# Patient Record
Sex: FEMALE | Race: WHITE | NOT HISPANIC OR LATINO | Employment: OTHER | URBAN - NONMETROPOLITAN AREA
[De-identification: names, ages, dates, MRNs, and addresses within clinical notes are randomized per-mention and may not be internally consistent; named-entity substitution may affect disease eponyms.]

---

## 2021-11-11 ENCOUNTER — OFFICE VISIT (OUTPATIENT)
Dept: CARDIOLOGY | Facility: CLINIC | Age: 61
End: 2021-11-11

## 2021-11-11 VITALS
BODY MASS INDEX: 43.72 KG/M2 | SYSTOLIC BLOOD PRESSURE: 120 MMHG | HEART RATE: 58 BPM | TEMPERATURE: 97.8 F | DIASTOLIC BLOOD PRESSURE: 72 MMHG | WEIGHT: 237.6 LBS | HEIGHT: 62 IN

## 2021-11-11 DIAGNOSIS — R07.2 PRECORDIAL PAIN: Primary | ICD-10-CM

## 2021-11-11 DIAGNOSIS — E83.42 HYPOMAGNESEMIA: ICD-10-CM

## 2021-11-11 DIAGNOSIS — E89.0 POSTOPERATIVE HYPOTHYROIDISM: ICD-10-CM

## 2021-11-11 DIAGNOSIS — J45.20 MILD INTERMITTENT ASTHMA WITHOUT COMPLICATION: ICD-10-CM

## 2021-11-11 DIAGNOSIS — G47.30 SLEEP APNEA IN ADULT: ICD-10-CM

## 2021-11-11 DIAGNOSIS — R06.02 SHORTNESS OF BREATH: ICD-10-CM

## 2021-11-11 DIAGNOSIS — E55.9 VITAMIN D DEFICIENCY: ICD-10-CM

## 2021-11-11 DIAGNOSIS — R73.9 HYPERGLYCEMIA: ICD-10-CM

## 2021-11-11 DIAGNOSIS — E66.01 MORBID OBESITY WITH BMI OF 40.0-44.9, ADULT (HCC): ICD-10-CM

## 2021-11-11 PROCEDURE — 99204 OFFICE O/P NEW MOD 45 MIN: CPT | Performed by: INTERNAL MEDICINE

## 2021-11-11 PROCEDURE — 93000 ELECTROCARDIOGRAM COMPLETE: CPT | Performed by: INTERNAL MEDICINE

## 2021-11-11 RX ORDER — FUROSEMIDE 20 MG/1
20 TABLET ORAL DAILY
COMMUNITY

## 2021-11-11 RX ORDER — LEVOTHYROXINE SODIUM 0.1 MG/1
100 TABLET ORAL DAILY
COMMUNITY
End: 2022-06-15

## 2021-11-11 RX ORDER — ALPRAZOLAM 0.25 MG/1
0.25 TABLET ORAL DAILY PRN
COMMUNITY
End: 2022-06-15

## 2021-11-11 RX ORDER — ALBUTEROL SULFATE 2.5 MG/.5ML
2.5 SOLUTION RESPIRATORY (INHALATION) 3 TIMES DAILY PRN
COMMUNITY

## 2021-11-11 RX ORDER — ALLOPURINOL 100 MG/1
100 TABLET ORAL DAILY PRN
COMMUNITY
End: 2022-06-15

## 2021-11-11 RX ORDER — CITALOPRAM 40 MG/1
40 TABLET ORAL DAILY
COMMUNITY

## 2021-11-11 RX ORDER — FLUTICASONE PROPIONATE 50 MCG
2 SPRAY, SUSPENSION (ML) NASAL DAILY
COMMUNITY

## 2021-11-11 RX ORDER — BUDESONIDE 1 MG/2ML
1 INHALANT ORAL DAILY PRN
COMMUNITY
End: 2022-06-15

## 2021-11-11 RX ORDER — ALBUTEROL SULFATE 90 UG/1
2 AEROSOL, METERED RESPIRATORY (INHALATION) EVERY 4 HOURS PRN
COMMUNITY

## 2021-11-11 NOTE — PROGRESS NOTES
Chief Complaint   Patient presents with   • Establish Care     Patient wishes to re -establish care, needing surgical clearance. She was last seen here 15 to 20 years ago.    • Cardiac clearance     Needing clearance for left knee replacement next Friday.   • Chest Pain     Having sharp pains in mid chest lasting from just a few minutes to couple hours. Has had few episodes of nausea associated with chest pain    • Shortness of Breath     Has with exertion and at rest, has noticed more since having covid last year. Wears oxygen as needed. PCP is sending her to Pulmonologist.   • Palpitations     Has occasional flutter at night.   • Aspirin     She is currently not taking due to upcoming surgery. She takes aspirin 81mg 3 times weekly.        CARDIAC COMPLAINTS  chest pressure/discomfort, dyspnea, fatigue and palpitations      Subjective   Danay Mcdowell is a 61 y.o. female came in today for her initial cardiac evaluation.  She has history of diabetes, hypothyroidism has not been on any diabetic medication for a long time.  She has been having severe arthritis and now scheduled to have a left knee surgery done next Friday.  She is now referred for pre op evaluation.  She apparently has been having sharp chest pain lasting from few minutes to few hours.  It occurs anytime of the day.  It is sometimes associated with nausea.  She also has been noticing shortness of breath which occurs mostly on exertion and occasionally at rest.  She did have Covid infection last year and since then the symptoms got worse.  She also started wearing oxygen and she is scheduled to see a pulmonologist soon.  She also has palpitation in the form of heart skipping.  It occurs mostly at nighttime.  She had some labs done in September and her blood sugar was 141.  Her renal function was normal.  Her cholesterol was 334 with the LDL of 206 with an HDL of 45 and triglyceride of 400.  Her blood count currently normal A1c was 6.8.  She has no  history of smoking or drinking alcohol.  Her mother had diabetes and thyroid related problems.  Her father had diabetes and heart attack and one of her sister  at age of 43 with an heart attack.    No past surgical history on file.    Current Outpatient Medications   Medication Sig Dispense Refill   • albuterol (PROVENTIL) 2.5 MG/0.5ML nebulizer solution Take 2.5 mg by nebulization 3 (Three) Times a Day As Needed for Wheezing.     • albuterol sulfate  (90 Base) MCG/ACT inhaler Inhale 2 puffs Every 4 (Four) Hours As Needed for Wheezing.     • allopurinol (ZYLOPRIM) 100 MG tablet Take 100 mg by mouth Daily As Needed.     • ALPRAZolam (XANAX) 0.25 MG tablet Take 0.25 mg by mouth Daily As Needed for Anxiety.     • Ascorbic Acid (VITAMIN C PO) Take  by mouth Daily.     • budesonide (PULMICORT) 1 MG/2ML nebulizer solution Take 1 mg by nebulization Daily As Needed.     • citalopram (CeleXA) 40 MG tablet Take 40 mg by mouth Daily.     • Cyanocobalamin (VITAMIN B 12 PO) Take  by mouth Daily.     • fluticasone (FLONASE) 50 MCG/ACT nasal spray 2 sprays into the nostril(s) as directed by provider Daily.     • furosemide (LASIX) 20 MG tablet Take 20 mg by mouth Daily.     • levothyroxine (SYNTHROID, LEVOTHROID) 100 MCG tablet Take 100 mcg by mouth Daily.     • Multiple Vitamins-Minerals (ZINC PO) Take  by mouth Daily.     • traMADol-acetaminophen (ULTRACET) 37.5-325 MG per tablet Take 1 tablet by mouth 4 (Four) Times a Day As Needed for Moderate Pain .     • VITAMIN D PO Take  by mouth Daily.       No current facility-administered medications for this visit.           ALLERGIES:  Cinnamon flavor and Phenergan [promethazine]    Past Medical History:   Diagnosis Date   • Chronic asthmatic bronchitis (HCC)    • Cirrhosis (HCC)    • Depression    • Diabetes mellitus (HCC)    • Fibromyalgia    • GERD (gastroesophageal reflux disease)    • History of back surgery    • Hx of cholecystectomy    • Hx of hysterectomy    • Hx of  "laparoscopic gastric banding    • Hx of thyroidectomy    • Hyperlipidemia    • Hypothyroidism    • Osteoarthritis    • Sleep apnea     currently does not wear CPAP   • Vitamin D deficiency        Social History     Tobacco Use   Smoking Status Never Smoker   Smokeless Tobacco Never Used          Family History   Problem Relation Age of Onset   • Diabetes Mother    • Thyroid disease Mother    • Liver disease Mother    • Diabetes Father    • Heart attack Father    • Diabetes Sister    • Heart attack Sister 43   • Heart disease Brother    • Heart attack Brother    • Diabetes Brother    • No Known Problems Daughter        Review of Systems   Constitutional: Positive for malaise/fatigue. Negative for decreased appetite.   HENT: Negative for congestion and sore throat.    Eyes: Negative for blurred vision.   Cardiovascular: Positive for chest pain and dyspnea on exertion.   Respiratory: Positive for shortness of breath. Negative for snoring.    Endocrine: Negative for cold intolerance and heat intolerance.   Hematologic/Lymphatic: Negative for adenopathy. Does not bruise/bleed easily.   Skin: Negative for itching, nail changes and skin cancer.   Musculoskeletal: Positive for arthritis and joint pain. Negative for myalgias.   Gastrointestinal: Negative for abdominal pain, dysphagia and heartburn.   Genitourinary: Negative for bladder incontinence and frequency.   Neurological: Negative for dizziness, light-headedness, seizures and vertigo.   Psychiatric/Behavioral: Negative for altered mental status.   Allergic/Immunologic: Negative for environmental allergies and hives.       Diabetes- Yes  Thyroid- abnormal    Objective     /72 (BP Location: Right arm)   Pulse 58   Temp 97.8 °F (36.6 °C)   Ht 157.5 cm (62\")   Wt 108 kg (237 lb 9.6 oz)   BMI 43.46 kg/m²     Vitals and nursing note reviewed.   Constitutional:       Appearance: Healthy appearance. Not in distress.   Eyes:      Conjunctiva/sclera: Conjunctivae " normal.      Pupils: Pupils are equal, round, and reactive to light.   HENT:      Head: Normocephalic.   Pulmonary:      Effort: Pulmonary effort is normal.      Breath sounds: Normal breath sounds.   Cardiovascular:      PMI at left midclavicular line. Normal rate. Regular rhythm.      Murmurs: There is a systolic murmur.   Abdominal:      General: Bowel sounds are normal.      Palpations: Abdomen is soft.   Musculoskeletal: Normal range of motion.      Cervical back: Normal range of motion and neck supple. Skin:     General: Skin is warm and dry.   Neurological:      Mental Status: Alert, oriented to person, place, and time and oriented to person, place and time.           ECG 12 Lead    Date/Time: 11/11/2021 3:31 PM  Performed by: Blossom Lerma MD  Authorized by: Blossom Lerma MD   Previous ECG: no previous ECG available  Rhythm: sinus rhythm  Rate: normal  QRS axis: normal  Other findings: low voltage and T wave abnormality    Clinical impression: non-specific ECG              Assessment/Plan   Patient's Body mass index is 43.46 kg/m². indicating that she is morbidly obese (BMI > 40 or > 35 with obesity - related health condition). Obesity-related health conditions include the following: obstructive sleep apnea, hypertension, diabetes mellitus and dyslipidemias. Obesity is newly identified. BMI is is above average; BMI management plan is completed. We discussed low calorie, low carb based diet program, portion control and increasing exercise..     Diagnoses and all orders for this visit:    1. Precordial pain (Primary)  -     Stress Test With Myocardial Perfusion One Day; Future  -     High Sensitivity CRP; Future  -     Lipid Panel; Future    2. Shortness of breath  -     Adult Transthoracic Echo Complete W/ Cont if Necessary Per Protocol; Future  -     Comprehensive Metabolic Panel; Future  -     CBC & Differential; Future    3. Postoperative hypothyroidism  -     TSH; Future    4. Morbid obesity  with BMI of 40.0-44.9, adult ([facility])  -     Stress Test With Myocardial Perfusion One Day; Future    5. Mild intermittent asthma without complication  -     Stress Test With Myocardial Perfusion One Day; Future    6. Sleep apnea in adult  -     Overnight Sleep Oximetry Study; Future    7. Hyperglycemia  -     Hemoglobin A1c; Future    8. Vitamin D deficiency  -     Vitamin D 25 Hydroxy; Future    9. Hypomagnesemia  -     Magnesium; Future       At baseline her heart rate is lower limit of normal.  Her blood pressure is normal.  Her EKG shows sinus bradycardia, small QRS complex, nonspecific ST-T changes.  Her clinical examination reveals a BMI of 43.  She has soft heart sounds she does have a soft systolic murmur.  She also has trace pedal edema    Regarding the chest pain, it appears to be atypical but she does have a lot of risk factors.  I scheduled her to undergo a stress test in the form of dobutamine Cardiolite to rule out ischemia causing the chest pain . I also advised her to check her lipid panel and CRP level.  If the LDL is still significantly elevated she needs to be on a statin.    Regarding the shortness of breath, it could be secondary to his obesity but need to rule out cardiac cause also.  I scheduled her to undergo an echocardiogram to evaluate the LV function, valvular structures and also to rule out any pericardial effusion.  She is also advised to have her electrolytes checked along with her renal function and the blood count    Regarding her hypothyroidism which she got after undergoing thyroidectomy many years ago, need to have the TSH level checked.  She is advised to continue the medication    Regarding the obesity, I had a long talk with her about the increased risk of developing problems.  I talked to her about Mediterranean diet and also intermittent fasting diet    Regarding her history of asthma, she is on inhalers.  She is advised to talk to the pulmonologist    Regarding her history of  sleep apnea, she is not using CPAP.  I like to recheck her nocturnal pulse PO2 measurement.  If it is significantly abnormal she may develop some problems during surgery from anesthesia.    Regarding her history of hyperglycemia, she is advised to have A1c level checked.  If it is significantly elevated she may need to go back on oral antidiabetic medication    Regarding her history of myalgia with statins in the past, I like to check a vitamin D level and if it is low corrected before starting the statin    If the LV function is normal and if there is no evidence of septal ischemia, cardiac clearance will be given.             Electronically signed by Blossom Lerma MD November 11, 2021 15:17 EST

## 2021-11-11 NOTE — PATIENT INSTRUCTIONS
Mediterranean Diet  A Mediterranean diet refers to food and lifestyle choices that are based on the traditions of countries located on the [place]. This way of eating has been shown to help prevent certain conditions and improve outcomes for people who have chronic diseases, like kidney disease and heart disease.  What are tips for following this plan?  Lifestyle  · Cook and eat meals together with your family, when possible.  · Drink enough fluid to keep your urine clear or pale yellow.  · Be physically active every day. This includes:  ? Aerobic exercise like running or swimming.  ? Leisure activities like gardening, walking, or housework.  · Get 7-8 hours of sleep each night.  · If recommended by your health care provider, drink red wine in moderation. This means 1 glass a day for nonpregnant women and 2 glasses a day for men. A glass of wine equals 5 oz (150 mL).  Reading food labels    · Check the serving size of packaged foods. For foods such as rice and pasta, the serving size refers to the amount of cooked product, not dry.  · Check the total fat in packaged foods. Avoid foods that have saturated fat or trans fats.  · Check the ingredients list for added sugars, such as corn syrup.    Shopping  · At the grocery store, buy most of your food from the areas near the walls of the store. This includes:  ? Fresh fruits and vegetables (produce).  ? Grains, beans, nuts, and seeds. Some of these may be available in unpackaged forms or large amounts (in bulk).  ? Fresh seafood.  ? Poultry and eggs.  ? Low-fat dairy products.  · Buy whole ingredients instead of prepackaged foods.  · Buy fresh fruits and vegetables in-season from local farmers markets.  · Buy frozen fruits and vegetables in resealable bags.  · If you do not have access to quality fresh seafood, buy precooked frozen shrimp or canned fish, such as tuna, salmon, or sardines.  · Buy small amounts of raw or cooked vegetables, salads, or olives from  the deli or salad bar at your store.  · Stock your pantry so you always have certain foods on hand, such as olive oil, canned tuna, canned tomatoes, rice, pasta, and beans.  Cooking  · Cook foods with extra-virgin olive oil instead of using butter or other vegetable oils.  · Have meat as a side dish, and have vegetables or grains as your main dish. This means having meat in small portions or adding small amounts of meat to foods like pasta or stew.  · Use beans or vegetables instead of meat in common dishes like chili or lasagna.  · [place] with different cooking methods. Try roasting or broiling vegetables instead of steaming or sautéeing them.  · Add frozen vegetables to soups, stews, pasta, or rice.  · Add nuts or seeds for added healthy fat at each meal. You can add these to yogurt, salads, or vegetable dishes.  · Marinate fish or vegetables using olive oil, lemon juice, garlic, and fresh herbs.  Meal planning    · Plan to eat 1 vegetarian meal one day each week. Try to work up to 2 vegetarian meals, if possible.  · Eat seafood 2 or more times a week.  · Have healthy snacks readily available, such as:  ? Vegetable sticks with hummus.  ? Greek yogurt.  ? Fruit and nut trail mix.  · Eat balanced meals throughout the week. This includes:  ? Fruit: 2-3 servings a day  ? Vegetables: 4-5 servings a day  ? Low-fat dairy: 2 servings a day  ? Fish, poultry, or lean meat: 1 serving a day  ? Beans and legumes: 2 or more servings a week  ? Nuts and seeds: 1-2 servings a day  ? Whole grains: 6-8 servings a day  ? Extra-virgin olive oil: 3-4 servings a day  · Limit red meat and sweets to only a few servings a month    What are my food choices?  · Mediterranean diet  ? Recommended  § Grains: Whole-grain pasta. Brown rice. Bulgar wheat. Polenta. Couscous. Whole-wheat bread. Oatmeal. Quinoa.  § Vegetables: Artichokes. Beets. Broccoli. Cabbage. Carrots. Eggplant. Green beans. Chard. Kale. Spinach. Onions. Leeks. Peas. Squash.  Tomatoes. Peppers. Radishes.  § Fruits: Apples. Apricots. Avocado. Berries. Bananas. Cherries. Dates. Figs. Grapes. [place]. Melon. Oranges. Peaches. Plums. Pomegranate.  § Meats and other protein foods: Beans. Almonds. Sunflower seeds. Pine nuts. Peanuts. Cod. [place]. Scallops. Shrimp. Tuna. Tilapia. Clams. Oysters. Eggs.  § Dairy: Low-fat milk. Cheese. Greek yogurt.  § Beverages: Water. Red wine. Herbal tea.  § Fats and oils: Extra virgin olive oil. Avocado oil. Grape seed oil.  § Sweets and desserts: Greek yogurt with honey. Baked apples. Poached pears. Trail mix.  § Seasoning and other foods: Basil. Cilantro. Coriander. Cumin. Mint. Parsley. Pepe. Rosemary. Tarragon. Garlic. Oregano. Thyme. Pepper. Balsalmic vinegar. Tahini. Hummus. Tomato sauce. Olives. Mushrooms.  ? Limit these  § Grains: Prepackaged pasta or rice dishes. Prepackaged cereal with added sugar.  § Vegetables: Deep fried potatoes (french fries).  § Fruits: Fruit canned in syrup.  § Meats and other protein foods: Beef. Pork. Lamb. Poultry with skin. Hot dogs. [place].  § Dairy: Ice cream. Sour cream. Whole milk.  § Beverages: Juice. Sugar-sweetened soft drinks. Beer. Liquor and spirits.  § Fats and oils: Butter. Canola oil. Vegetable oil. Beef fat (tallow). Lard.  § Sweets and desserts: Cookies. Cakes. Pies. Candy.  § Seasoning and other foods: Mayonnaise. Premade sauces and marinades.  The items listed may not be a complete list. Talk with your dietitian about what dietary choices are right for you.  Summary  · The Mediterranean diet includes both food and lifestyle choices.  · Eat a variety of fresh fruits and vegetables, beans, nuts, seeds, and whole grains.  · Limit the amount of red meat and sweets that you eat.  · Talk with your health care provider about whether it is safe for you to drink red wine in moderation. This means 1 glass a day for nonpregnant women and 2 glasses a day for men. A glass of wine equals 5 oz (150 mL).  This information  is not intended to replace advice given to you by your health care provider. Make sure you discuss any questions you have with your health care provider.  Document Revised: 08/17/2017 Document Reviewed: 08/10/2017  Elsevier Patient Education © 2020 [employer]

## 2021-11-15 ENCOUNTER — LAB (OUTPATIENT)
Dept: LAB | Facility: HOSPITAL | Age: 61
End: 2021-11-15

## 2021-11-15 ENCOUNTER — HOSPITAL ENCOUNTER (OUTPATIENT)
Dept: CARDIOLOGY | Facility: HOSPITAL | Age: 61
Discharge: HOME | End: 2021-11-15

## 2021-11-15 ENCOUNTER — TRANSCRIBE ORDERS (OUTPATIENT)
Dept: LAB | Facility: HOSPITAL | Age: 61
End: 2021-11-15

## 2021-11-15 DIAGNOSIS — R73.03 PRE-DIABETES: ICD-10-CM

## 2021-11-15 DIAGNOSIS — K74.60 HEPATIC CIRRHOSIS, UNSPECIFIED HEPATIC CIRRHOSIS TYPE, UNSPECIFIED WHETHER ASCITES PRESENT (HCC): ICD-10-CM

## 2021-11-15 DIAGNOSIS — E66.01 MORBID OBESITY WITH BMI OF 40.0-44.9, ADULT (HCC): ICD-10-CM

## 2021-11-15 DIAGNOSIS — Z01.818: ICD-10-CM

## 2021-11-15 DIAGNOSIS — E89.0 POSTOPERATIVE HYPOTHYROIDISM: ICD-10-CM

## 2021-11-15 DIAGNOSIS — R07.2 PRECORDIAL PAIN: ICD-10-CM

## 2021-11-15 DIAGNOSIS — E83.42 HYPOMAGNESEMIA: ICD-10-CM

## 2021-11-15 DIAGNOSIS — E55.9 VITAMIN D DEFICIENCY: ICD-10-CM

## 2021-11-15 DIAGNOSIS — J45.20 MILD INTERMITTENT ASTHMA WITHOUT COMPLICATION: ICD-10-CM

## 2021-11-15 DIAGNOSIS — Z01.818: Primary | ICD-10-CM

## 2021-11-15 DIAGNOSIS — R06.02 SHORTNESS OF BREATH: ICD-10-CM

## 2021-11-15 DIAGNOSIS — R73.9 HYPERGLYCEMIA: ICD-10-CM

## 2021-11-15 LAB
ALBUMIN SERPL-MCNC: 4.33 G/DL (ref 3.5–5.2)
ALBUMIN SERPL-MCNC: 4.34 G/DL (ref 3.5–5.2)
ALBUMIN/GLOB SERPL: 1.6 G/DL
ALBUMIN/GLOB SERPL: 1.6 G/DL
ALP SERPL-CCNC: 57 U/L (ref 39–117)
ALP SERPL-CCNC: 58 U/L (ref 39–117)
ALT SERPL W P-5'-P-CCNC: 16 U/L (ref 1–33)
ALT SERPL W P-5'-P-CCNC: 16 U/L (ref 1–33)
ANION GAP SERPL CALCULATED.3IONS-SCNC: 13.8 MMOL/L (ref 5–15)
ANION GAP SERPL CALCULATED.3IONS-SCNC: 14.3 MMOL/L (ref 5–15)
AST SERPL-CCNC: 17 U/L (ref 1–32)
AST SERPL-CCNC: 17 U/L (ref 1–32)
BASOPHILS # BLD AUTO: 0.08 10*3/MM3 (ref 0–0.2)
BASOPHILS # BLD AUTO: 0.09 10*3/MM3 (ref 0–0.2)
BASOPHILS NFR BLD AUTO: 1 % (ref 0–1.5)
BASOPHILS NFR BLD AUTO: 1.1 % (ref 0–1.5)
BH CV ECHO MEAS - ACS: 1.7 CM
BH CV ECHO MEAS - AO MAX PG: 15.8 MMHG
BH CV ECHO MEAS - AO MEAN PG: 8 MMHG
BH CV ECHO MEAS - AO ROOT AREA (BSA CORRECTED): 1.4
BH CV ECHO MEAS - AO ROOT AREA: 6.4 CM^2
BH CV ECHO MEAS - AO ROOT DIAM: 2.9 CM
BH CV ECHO MEAS - AO V2 MAX: 199 CM/SEC
BH CV ECHO MEAS - AO V2 MEAN: 133 CM/SEC
BH CV ECHO MEAS - AO V2 VTI: 39.4 CM
BH CV ECHO MEAS - BSA(HAYCOCK): 2.2 M^2
BH CV ECHO MEAS - BSA: 2.1 M^2
BH CV ECHO MEAS - BZI_BMI: 43.3 KILOGRAMS/M^2
BH CV ECHO MEAS - BZI_METRIC_HEIGHT: 157.5 CM
BH CV ECHO MEAS - BZI_METRIC_WEIGHT: 107.5 KG
BH CV ECHO MEAS - EDV(CUBED): 62.6 ML
BH CV ECHO MEAS - EDV(MOD-SP4): 75.8 ML
BH CV ECHO MEAS - EDV(TEICH): 68.8 ML
BH CV ECHO MEAS - EF(CUBED): 75.9 %
BH CV ECHO MEAS - EF(MOD-BP): 63 %
BH CV ECHO MEAS - EF(MOD-SP4): 63.9 %
BH CV ECHO MEAS - EF(TEICH): 68.5 %
BH CV ECHO MEAS - ESV(CUBED): 15.1 ML
BH CV ECHO MEAS - ESV(MOD-SP4): 27.4 ML
BH CV ECHO MEAS - ESV(TEICH): 21.7 ML
BH CV ECHO MEAS - FS: 37.8 %
BH CV ECHO MEAS - IVS/LVPW: 1.4
BH CV ECHO MEAS - IVSD: 1.6 CM
BH CV ECHO MEAS - LA DIMENSION: 4.1 CM
BH CV ECHO MEAS - LA/AO: 1.4
BH CV ECHO MEAS - LAT PEAK E' VEL: 7.1 CM/SEC
BH CV ECHO MEAS - LV DIASTOLIC VOL/BSA (35-75): 36.9 ML/M^2
BH CV ECHO MEAS - LV IVRT: 0.11 SEC
BH CV ECHO MEAS - LV MASS(C)D: 206.7 GRAMS
BH CV ECHO MEAS - LV MASS(C)DI: 100.6 GRAMS/M^2
BH CV ECHO MEAS - LV SYSTOLIC VOL/BSA (12-30): 13.3 ML/M^2
BH CV ECHO MEAS - LVIDD: 4 CM
BH CV ECHO MEAS - LVIDS: 2.5 CM
BH CV ECHO MEAS - LVLD AP4: 8.2 CM
BH CV ECHO MEAS - LVLS AP4: 6.3 CM
BH CV ECHO MEAS - LVOT AREA (M): 3.5 CM^2
BH CV ECHO MEAS - LVOT AREA: 3.5 CM^2
BH CV ECHO MEAS - LVOT DIAM: 2.1 CM
BH CV ECHO MEAS - LVPWD: 1.2 CM
BH CV ECHO MEAS - MED PEAK E' VEL: 6.1 CM/SEC
BH CV ECHO MEAS - MV A MAX VEL: 78.8 CM/SEC
BH CV ECHO MEAS - MV DEC SLOPE: 205 CM/SEC^2
BH CV ECHO MEAS - MV E MAX VEL: 74.6 CM/SEC
BH CV ECHO MEAS - MV E/A: 0.95
BH CV ECHO MEAS - RVDD: 3.2 CM
BH CV ECHO MEAS - SI(AO): 122.3 ML/M^2
BH CV ECHO MEAS - SI(CUBED): 23.1 ML/M^2
BH CV ECHO MEAS - SI(MOD-SP4): 23.6 ML/M^2
BH CV ECHO MEAS - SI(TEICH): 22.9 ML/M^2
BH CV ECHO MEAS - SV(AO): 251.3 ML
BH CV ECHO MEAS - SV(CUBED): 47.5 ML
BH CV ECHO MEAS - SV(MOD-SP4): 48.4 ML
BH CV ECHO MEAS - SV(TEICH): 47.1 ML
BH CV ECHO MEASUREMENTS AVERAGE E/E' RATIO: 11.3
BH CV REST NUCLEAR ISOTOPE DOSE: 10 MCI
BH CV STRESS DOSE DOBUTAMINE STAGE 1: 10
BH CV STRESS DURATION MIN STAGE 1: 2
BH CV STRESS DURATION SEC STAGE 1: 0
BH CV STRESS NUCLEAR ISOTOPE DOSE: 30 MCI
BH CV STRESS PROTOCOL 1: NORMAL
BH CV STRESS RECOVERY BP: NORMAL MMHG
BH CV STRESS RECOVERY HR: 89 BPM
BH CV STRESS STAGE 1: 1
BILIRUB SERPL-MCNC: 0.3 MG/DL (ref 0–1.2)
BILIRUB SERPL-MCNC: 0.3 MG/DL (ref 0–1.2)
BUN SERPL-MCNC: 12 MG/DL (ref 8–23)
BUN SERPL-MCNC: 13 MG/DL (ref 8–23)
BUN/CREAT SERPL: 14.8 (ref 7–25)
BUN/CREAT SERPL: 17.1 (ref 7–25)
CALCIUM SPEC-SCNC: 9.4 MG/DL (ref 8.6–10.5)
CALCIUM SPEC-SCNC: 9.5 MG/DL (ref 8.6–10.5)
CHLORIDE SERPL-SCNC: 103 MMOL/L (ref 98–107)
CHLORIDE SERPL-SCNC: 104 MMOL/L (ref 98–107)
CHOLEST SERPL-MCNC: 295 MG/DL (ref 0–200)
CO2 SERPL-SCNC: 23.7 MMOL/L (ref 22–29)
CO2 SERPL-SCNC: 24.2 MMOL/L (ref 22–29)
CREAT SERPL-MCNC: 0.76 MG/DL (ref 0.57–1)
CREAT SERPL-MCNC: 0.81 MG/DL (ref 0.57–1)
DEPRECATED RDW RBC AUTO: 43 FL (ref 37–54)
DEPRECATED RDW RBC AUTO: 43.4 FL (ref 37–54)
EOSINOPHIL # BLD AUTO: 0.29 10*3/MM3 (ref 0–0.4)
EOSINOPHIL # BLD AUTO: 0.48 10*3/MM3 (ref 0–0.4)
EOSINOPHIL NFR BLD AUTO: 3.7 % (ref 0.3–6.2)
EOSINOPHIL NFR BLD AUTO: 6.2 % (ref 0.3–6.2)
ERYTHROCYTE [DISTWIDTH] IN BLOOD BY AUTOMATED COUNT: 13.2 % (ref 12.3–15.4)
ERYTHROCYTE [DISTWIDTH] IN BLOOD BY AUTOMATED COUNT: 13.2 % (ref 12.3–15.4)
GFR SERPL CREATININE-BSD FRML MDRD: 71 ML/MIN/1.73
GFR SERPL CREATININE-BSD FRML MDRD: 77 ML/MIN/1.73
GLOBULIN UR ELPH-MCNC: 2.7 GM/DL
GLOBULIN UR ELPH-MCNC: 2.7 GM/DL
GLUCOSE SERPL-MCNC: 117 MG/DL (ref 65–99)
GLUCOSE SERPL-MCNC: 117 MG/DL (ref 65–99)
HBA1C MFR BLD: 6.3 % (ref 4.8–5.6)
HBA1C MFR BLD: 6.6 % (ref 4.8–5.6)
HCT VFR BLD AUTO: 45.6 % (ref 34–46.6)
HCT VFR BLD AUTO: 45.9 % (ref 34–46.6)
HDLC SERPL-MCNC: 43 MG/DL (ref 40–60)
HGB BLD-MCNC: 14.5 G/DL (ref 12–15.9)
HGB BLD-MCNC: 14.8 G/DL (ref 12–15.9)
IMM GRANULOCYTES # BLD AUTO: 0.03 10*3/MM3 (ref 0–0.05)
IMM GRANULOCYTES # BLD AUTO: 0.03 10*3/MM3 (ref 0–0.05)
IMM GRANULOCYTES NFR BLD AUTO: 0.4 % (ref 0–0.5)
IMM GRANULOCYTES NFR BLD AUTO: 0.4 % (ref 0–0.5)
INR PPP: 0.92 (ref 0.9–1.1)
LDLC SERPL CALC-MCNC: 178 MG/DL (ref 0–100)
LDLC/HDLC SERPL: 4.11 {RATIO}
LV EF NUC BP: 66 %
LYMPHOCYTES # BLD AUTO: 2.03 10*3/MM3 (ref 0.7–3.1)
LYMPHOCYTES # BLD AUTO: 2.11 10*3/MM3 (ref 0.7–3.1)
LYMPHOCYTES NFR BLD AUTO: 26.3 % (ref 19.6–45.3)
LYMPHOCYTES NFR BLD AUTO: 26.8 % (ref 19.6–45.3)
MAGNESIUM SERPL-MCNC: 1.6 MG/DL (ref 1.6–2.4)
MAXIMAL PREDICTED HEART RATE: 157 BPM
MAXIMAL PREDICTED HEART RATE: 157 BPM
MCH RBC QN AUTO: 28.4 PG (ref 26.6–33)
MCH RBC QN AUTO: 28.8 PG (ref 26.6–33)
MCHC RBC AUTO-ENTMCNC: 31.8 G/DL (ref 31.5–35.7)
MCHC RBC AUTO-ENTMCNC: 32.2 G/DL (ref 31.5–35.7)
MCV RBC AUTO: 89.3 FL (ref 79–97)
MCV RBC AUTO: 89.4 FL (ref 79–97)
MONOCYTES # BLD AUTO: 0.6 10*3/MM3 (ref 0.1–0.9)
MONOCYTES # BLD AUTO: 0.65 10*3/MM3 (ref 0.1–0.9)
MONOCYTES NFR BLD AUTO: 7.8 % (ref 5–12)
MONOCYTES NFR BLD AUTO: 8.3 % (ref 5–12)
NEUTROPHILS NFR BLD AUTO: 4.49 10*3/MM3 (ref 1.7–7)
NEUTROPHILS NFR BLD AUTO: 4.7 10*3/MM3 (ref 1.7–7)
NEUTROPHILS NFR BLD AUTO: 58.3 % (ref 42.7–76)
NEUTROPHILS NFR BLD AUTO: 59.7 % (ref 42.7–76)
NRBC BLD AUTO-RTO: 0 /100 WBC (ref 0–0.2)
NRBC BLD AUTO-RTO: 0 /100 WBC (ref 0–0.2)
PERCENT MAX PREDICTED HR: 85.35 %
PLATELET # BLD AUTO: 278 10*3/MM3 (ref 140–450)
PLATELET # BLD AUTO: 298 10*3/MM3 (ref 140–450)
PMV BLD AUTO: 11 FL (ref 6–12)
PMV BLD AUTO: 11.3 FL (ref 6–12)
POTASSIUM SERPL-SCNC: 4 MMOL/L (ref 3.5–5.2)
POTASSIUM SERPL-SCNC: 4 MMOL/L (ref 3.5–5.2)
PROT SERPL-MCNC: 7 G/DL (ref 6–8.5)
PROT SERPL-MCNC: 7 G/DL (ref 6–8.5)
PROTHROMBIN TIME: 12.8 SECONDS (ref 12.8–14.5)
RBC # BLD AUTO: 5.1 10*6/MM3 (ref 3.77–5.28)
RBC # BLD AUTO: 5.14 10*6/MM3 (ref 3.77–5.28)
SODIUM SERPL-SCNC: 141 MMOL/L (ref 136–145)
SODIUM SERPL-SCNC: 142 MMOL/L (ref 136–145)
STRESS BASELINE BP: NORMAL MMHG
STRESS BASELINE HR: 57 BPM
STRESS PERCENT HR: 100 %
STRESS POST PEAK BP: NORMAL MMHG
STRESS POST PEAK HR: 134 BPM
STRESS TARGET HR: 133 BPM
STRESS TARGET HR: 133 BPM
TRIGL SERPL-MCNC: 377 MG/DL (ref 0–150)
TSH SERPL DL<=0.05 MIU/L-ACNC: 0.86 UIU/ML (ref 0.27–4.2)
VLDLC SERPL-MCNC: 74 MG/DL (ref 5–40)
WBC # BLD AUTO: 7.71 10*3/MM3 (ref 3.4–10.8)
WBC # BLD AUTO: 7.87 10*3/MM3 (ref 3.4–10.8)

## 2021-11-15 PROCEDURE — 36415 COLL VENOUS BLD VENIPUNCTURE: CPT

## 2021-11-15 PROCEDURE — 83036 HEMOGLOBIN GLYCOSYLATED A1C: CPT

## 2021-11-15 PROCEDURE — 84443 ASSAY THYROID STIM HORMONE: CPT

## 2021-11-15 PROCEDURE — 85610 PROTHROMBIN TIME: CPT

## 2021-11-15 PROCEDURE — A9500 TC99M SESTAMIBI: HCPCS | Performed by: INTERNAL MEDICINE

## 2021-11-15 PROCEDURE — 78452 HT MUSCLE IMAGE SPECT MULT: CPT

## 2021-11-15 PROCEDURE — 80061 LIPID PANEL: CPT

## 2021-11-15 PROCEDURE — 80053 COMPREHEN METABOLIC PANEL: CPT

## 2021-11-15 PROCEDURE — 82985 ASSAY OF GLYCATED PROTEIN: CPT

## 2021-11-15 PROCEDURE — 85025 COMPLETE CBC W/AUTO DIFF WBC: CPT

## 2021-11-15 PROCEDURE — 93017 CV STRESS TEST TRACING ONLY: CPT

## 2021-11-15 PROCEDURE — 25010000002 DOBUTAMINE PER 250 MG: Performed by: INTERNAL MEDICINE

## 2021-11-15 PROCEDURE — 93306 TTE W/DOPPLER COMPLETE: CPT

## 2021-11-15 PROCEDURE — 93306 TTE W/DOPPLER COMPLETE: CPT | Performed by: INTERNAL MEDICINE

## 2021-11-15 PROCEDURE — 0 TECHNETIUM SESTAMIBI: Performed by: INTERNAL MEDICINE

## 2021-11-15 PROCEDURE — 83735 ASSAY OF MAGNESIUM: CPT

## 2021-11-15 PROCEDURE — 78452 HT MUSCLE IMAGE SPECT MULT: CPT | Performed by: INTERNAL MEDICINE

## 2021-11-15 PROCEDURE — 86141 C-REACTIVE PROTEIN HS: CPT

## 2021-11-15 PROCEDURE — 82306 VITAMIN D 25 HYDROXY: CPT

## 2021-11-15 PROCEDURE — 93018 CV STRESS TEST I&R ONLY: CPT | Performed by: INTERNAL MEDICINE

## 2021-11-15 RX ORDER — DOBUTAMINE HYDROCHLORIDE 200 MG/100ML
10 INJECTION INTRAVENOUS
Status: COMPLETED | OUTPATIENT
Start: 2021-11-15 | End: 2021-11-15

## 2021-11-15 RX ADMIN — TECHNETIUM TC 99M SESTAMIBI 1 DOSE: 1 INJECTION INTRAVENOUS at 12:13

## 2021-11-15 RX ADMIN — DOBUTAMINE HYDROCHLORIDE 10 MCG/KG/MIN: 200 INJECTION INTRAVENOUS at 12:13

## 2021-11-15 RX ADMIN — TECHNETIUM TC 99M SESTAMIBI 1 DOSE: 1 INJECTION INTRAVENOUS at 09:44

## 2021-11-16 LAB
25(OH)D3 SERPL-MCNC: 30.9 NG/ML (ref 30–100)
CRP SERPL-MCNC: 0.98 MG/DL (ref 0.01–0.5)
FRUCTOSAMINE SERPL-SCNC: 227 UMOL/L (ref 0–285)

## 2022-06-15 ENCOUNTER — OFFICE VISIT (OUTPATIENT)
Dept: CARDIOLOGY | Facility: CLINIC | Age: 62
End: 2022-06-15

## 2022-06-15 VITALS
SYSTOLIC BLOOD PRESSURE: 110 MMHG | DIASTOLIC BLOOD PRESSURE: 62 MMHG | BODY MASS INDEX: 44.68 KG/M2 | HEIGHT: 62 IN | WEIGHT: 242.8 LBS | HEART RATE: 68 BPM

## 2022-06-15 DIAGNOSIS — E78.5 HYPERLIPIDEMIA LDL GOAL <100: Primary | ICD-10-CM

## 2022-06-15 DIAGNOSIS — R06.02 SHORTNESS OF BREATH: ICD-10-CM

## 2022-06-15 PROCEDURE — 99213 OFFICE O/P EST LOW 20 MIN: CPT | Performed by: NURSE PRACTITIONER

## 2022-06-15 RX ORDER — LEVOTHYROXINE SODIUM 112 UG/1
112 TABLET ORAL DAILY
COMMUNITY

## 2022-06-15 NOTE — PROGRESS NOTES
Chief Complaint   Patient presents with   • Follow-up     Cardiac management     • Lab     Last labs within the last month per Dr Mcdowell and per PCP 2 months ago.   • Shortness of Breath     Continues to have with minimal exertion. To re-start CPAP tomorrow, follows with Pulmonologist in [place].   • Med Refill     PCP writes refills. Patient went over medications verbally.     Subjective       Danay Mcdowell is a 62 y.o. female with diabetes, hypothyroidism and severe arthritis who was referred for cardiac evaluation/clearance prior to undergoing knee surgery. She admitted to intermittent sharp chest pain, cardiac work up ordered. Dobutamine stress showed hypertensive response but no ischemic changes. EF normal. She was cleared for surgery. Labs showed , Tri 377, HDL 43, , normal thyroid, LFT, CBC. Crestor recommended but she wanted to avoid statin secondary to her liver problem, fatty liver cirrhosis. Fenofibrate then added but made her sick. She did not tolerate Tricor in the past. She is planning to bariatric surgeon in [place] regarding her lap band removal and other options for weight loss. She returns today for follow up. Denies anginal chest pain. Following low sugar diet. Planning to restart CPAP after abnormal overnight. Follows with Dr. Mcdowell and PCP for labs.        Cardiac History:    Past Surgical History:   Procedure Laterality Date   • CARDIOVASCULAR STRESS TEST  11/15/2021    Dobutamine- 86% THR. EF 66%. R/O Breast attenuation. BP- 151/113   • ECHO - CONVERTED  11/15/2021    EF 65%. LA- 4.1 Cm. Mild MR   • OTHER SURGICAL HISTORY  11/30/2021    Pulse O2- Abnormal     Current Outpatient Medications   Medication Sig Dispense Refill   • albuterol (PROVENTIL) 2.5 MG/0.5ML nebulizer solution Take 2.5 mg by nebulization 3 (Three) Times a Day As Needed for Wheezing.     • albuterol sulfate  (90 Base) MCG/ACT inhaler Inhale 2 puffs Every 4 (Four) Hours As Needed for Wheezing.     •  citalopram (CeleXA) 40 MG tablet Take 40 mg by mouth Daily.     • fluticasone (FLONASE) 50 MCG/ACT nasal spray 2 sprays into the nostril(s) as directed by provider Daily.     • furosemide (LASIX) 20 MG tablet Take 20 mg by mouth Daily.     • levothyroxine (SYNTHROID, LEVOTHROID) 112 MCG tablet Take 112 mcg by mouth Daily.     • linaclotide (Linzess) 72 MCG capsule capsule Take 72 mcg by mouth Every Morning Before Breakfast.     • traMADol-acetaminophen (ULTRACET) 37.5-325 MG per tablet Take 1 tablet by mouth 4 (Four) Times a Day As Needed for Moderate Pain .       No current facility-administered medications for this visit.     Cinnamon flavor and Phenergan [promethazine]    Past Medical History:   Diagnosis Date   • Chronic asthmatic bronchitis (HCC)    • Cirrhosis (HCC)    • Depression    • Diabetes mellitus (HCC)    • Fibromyalgia    • GERD (gastroesophageal reflux disease)    • History of back surgery    • Hx of cholecystectomy    • Hx of hysterectomy    • Hx of laparoscopic gastric banding    • Hx of thyroidectomy    • Hyperlipidemia    • Hypothyroidism    • Osteoarthritis    • Sleep apnea     currently does not wear CPAP   • Vitamin D deficiency      Social History     Socioeconomic History   • Marital status:    Tobacco Use   • Smoking status: Never Smoker   • Smokeless tobacco: Never Used   Vaping Use   • Vaping Use: Never used   Substance and Sexual Activity   • Alcohol use: Never   • Drug use: Never   • Sexual activity: Defer     Family History   Problem Relation Age of Onset   • Diabetes Mother    • Thyroid disease Mother    • Liver disease Mother    • Diabetes Father    • Heart attack Father    • Diabetes Sister    • Heart attack Sister 43   • Heart disease Brother    • Heart attack Brother    • Diabetes Brother    • No Known Problems Daughter      Review of Systems   Constitutional: Positive for weight gain (+5). Negative for decreased appetite and malaise/fatigue.   HENT: Negative.    Eyes:  "Negative for blurred vision.   Cardiovascular: Positive for dyspnea on exertion. Negative for chest pain, leg swelling, palpitations and syncope.   Respiratory: Positive for shortness of breath. Negative for sleep disturbances due to breathing.    Endocrine: Negative.    Hematologic/Lymphatic: Negative for bleeding problem. Does not bruise/bleed easily.   Skin: Negative.    Musculoskeletal: Negative for falls and myalgias.   Gastrointestinal: Negative for abdominal pain, heartburn and melena.   Genitourinary: Negative for hematuria.   Neurological: Negative for dizziness and light-headedness.   Psychiatric/Behavioral: Negative for altered mental status.   Allergic/Immunologic: Negative.       Objective     /62 (BP Location: Left arm)   Pulse 68   Ht 157.5 cm (62.01\")   Wt 110 kg (242 lb 12.8 oz)   BMI 44.40 kg/m²     Vitals and nursing note reviewed.   Constitutional:       General: Not in acute distress.     Appearance: Well-developed. Not diaphoretic.   Eyes:      Pupils: Pupils are equal, round, and reactive to light.   HENT:      Head: Normocephalic.   Pulmonary:      Effort: Pulmonary effort is normal. No respiratory distress.      Breath sounds: Normal breath sounds.   Cardiovascular:      Normal rate. Regular rhythm.   Pulses:     Intact distal pulses.   Abdominal:      General: Bowel sounds are normal.      Palpations: Abdomen is soft.   Musculoskeletal: Normal range of motion.      Cervical back: Normal range of motion. Skin:     General: Skin is warm and dry.   Neurological:      Mental Status: Alert and oriented to person, place, and time.        Procedures          Problem List Items Addressed This Visit        Cardiac and Vasculature    Hyperlipidemia LDL goal <100 - Primary       Pulmonary and Pneumonias    Shortness of breath         1. Stress test results reviewed with her. HTN bp response. Encourage low sodium diet. Weight loss.     2. Hyperlipidemia- she prefers to be managed " conservatively as she is intolerant to most cholesterol lowering medications.  Limit sugars, CHO, increase lean protein and plant based protein.     3. LOUISE- resume CPAP, follow with pulmonology.     No changes made today. FU in one year or sooner as needed.     Class 3 Severe Obesity (BMI >=40). Obesity-related health conditions include the following: obstructive sleep apnea, hypertension, coronary heart disease, diabetes mellitus and dyslipidemias. Obesity is unchanged. BMI is is above average; BMI management plan is completed. We discussed portion control and increasing exercise.               Electronically signed by HERBIE Barron,  June 17, 2022 21:54 EDT

## 2022-06-17 PROBLEM — E78.5 HYPERLIPIDEMIA LDL GOAL <100: Status: ACTIVE | Noted: 2022-06-17

## 2023-06-15 ENCOUNTER — OFFICE VISIT (OUTPATIENT)
Dept: CARDIOLOGY | Facility: CLINIC | Age: 63
End: 2023-06-15
Payer: MEDICARE

## 2023-06-15 VITALS
WEIGHT: 245.2 LBS | HEART RATE: 64 BPM | DIASTOLIC BLOOD PRESSURE: 80 MMHG | HEIGHT: 62 IN | SYSTOLIC BLOOD PRESSURE: 130 MMHG | BODY MASS INDEX: 45.12 KG/M2

## 2023-06-15 DIAGNOSIS — E66.01 MORBID OBESITY WITH BMI OF 40.0-44.9, ADULT: Primary | ICD-10-CM

## 2023-06-15 DIAGNOSIS — E11.9 TYPE 2 DIABETES MELLITUS WITHOUT COMPLICATION, WITHOUT LONG-TERM CURRENT USE OF INSULIN: ICD-10-CM

## 2023-06-15 DIAGNOSIS — K76.0 FATTY LIVER: ICD-10-CM

## 2023-06-15 RX ORDER — MULTIPLE VITAMINS W/ MINERALS TAB 9MG-400MCG
1 TAB ORAL DAILY
COMMUNITY

## 2023-06-15 NOTE — LETTER
June 19, 2023       No Recipients    Patient: Danay Mcdowell   YOB: 1960   Date of Visit: 6/15/2023       Dear Simran Bennett MD    Danay Mcdowell was in my office today. Below is a copy of my note.    If you have questions, please do not hesitate to call me. I look forward to following Danay along with you.         Sincerely,        HERBIE Barron        CC:   No Recipients    Chief Complaint   Patient presents with   • Follow-up     Cardiac management   • Lab     Last labs about 3 months ago per PCP.   • Shortness of Breath     Same as before, no worsening. Wears oxygen as needed. Has CPAP at night, does not wear daily.   • Med Refill     PCP writes refills on medications.     Subjective      Danay Mcdowell is a 63 y.o. female with diabetes, hypothyroidism and severe arthritis referred for cardiac clearance prior to undergoing knee surgery. She admitted to intermittent sharp chest pain. Dobutamine stress showed hypertensive response but no ischemic changes. EF normal. She was cleared for surgery. Labs showed , Tri 377, HDL 43, , normal thyroid, LFT, CBC. Crestor recommended but she wanted to avoid statin secondary to her liver problem, fatty liver cirrhosis. Fenofibrate then added but made her sick. She would like to undergo bariatric surgery. She has gastric band which has been ineffective.     She returns today for follow up. Her main concern is weight gain. She has SOB with exertion and fatigue she attributes to her weight. Her daughter and her  underwent gastric sleeve and she would like to try medical weight loss before undergoing surgery. Labs and refills per PCP.        Cardiac History:    Past Surgical History:   Procedure Laterality Date   • CARDIOVASCULAR STRESS TEST  11/15/2021    Dobutamine- 86% THR. EF 66%. R/O Breast attenuation. BP- 151/113   • ECHO - CONVERTED  11/15/2021    EF 65%. LA- 4.1 Cm. Mild MR   • OTHER SURGICAL HISTORY  11/30/2021    Pulse O2- Abnormal      Current Outpatient Medications   Medication Sig Dispense Refill   • albuterol (PROVENTIL) 2.5 MG/0.5ML nebulizer solution Take 2.5 mg by nebulization 3 (Three) Times a Day As Needed for Wheezing.     • albuterol sulfate  (90 Base) MCG/ACT inhaler Inhale 2 puffs Every 4 (Four) Hours As Needed for Wheezing.     • citalopram (CeleXA) 40 MG tablet Take 1 tablet by mouth Daily.     • fluticasone (FLONASE) 50 MCG/ACT nasal spray 2 sprays into the nostril(s) as directed by provider Daily.     • furosemide (LASIX) 20 MG tablet Take 1 tablet by mouth Daily.     • levothyroxine (SYNTHROID, LEVOTHROID) 112 MCG tablet Take 1 tablet by mouth Daily.     • linaclotide (LINZESS) 72 MCG capsule capsule Take 1 capsule by mouth Daily As Needed.     • multivitamin with minerals (MULTIVITAMIN ADULTS PO) Take 1 tablet by mouth Daily.     • traMADol-acetaminophen (ULTRACET) 37.5-325 MG per tablet Take 1 tablet by mouth 4 (Four) Times a Day As Needed for Moderate Pain.       No current facility-administered medications for this visit.     Cinnamon flavor and Phenergan [promethazine]    Past Medical History:   Diagnosis Date   • Chronic asthmatic bronchitis    • Cirrhosis    • Depression    • Diabetes mellitus    • Fibromyalgia    • GERD (gastroesophageal reflux disease)    • History of back surgery    • Hx of cholecystectomy    • Hx of hysterectomy    • Hx of laparoscopic gastric banding    • Hx of thyroidectomy    • Hyperlipidemia    • Hypothyroidism    • Osteoarthritis    • Sleep apnea     currently does not wear CPAP   • Vitamin D deficiency      Social History     Socioeconomic History   • Marital status:    Tobacco Use   • Smoking status: Never   • Smokeless tobacco: Never   Vaping Use   • Vaping Use: Never used   Substance and Sexual Activity   • Alcohol use: Never   • Drug use: Never   • Sexual activity: Defer     Family History   Problem Relation Age of Onset   • Diabetes Mother    • Thyroid disease Mother    •  "Liver disease Mother    • Diabetes Father    • Heart attack Father    • Diabetes Sister    • Heart attack Sister 43   • Heart disease Brother    • Heart attack Brother    • Diabetes Brother    • No Known Problems Daughter        Review of Systems   Constitutional: Positive for malaise/fatigue and weight gain. Negative for decreased appetite.   HENT: Negative.    Eyes: Negative for blurred vision.   Cardiovascular: Positive for dyspnea on exertion. Negative for chest pain, leg swelling, palpitations and syncope.   Respiratory: Negative for shortness of breath and sleep disturbances due to breathing.    Endocrine: Negative.    Hematologic/Lymphatic: Negative for bleeding problem. Does not bruise/bleed easily.   Skin: Negative.    Musculoskeletal: Negative for falls and myalgias.   Gastrointestinal: Negative for abdominal pain, heartburn and melena.   Genitourinary: Negative for hematuria.   Neurological: Negative for dizziness and light-headedness.   Psychiatric/Behavioral: Negative for altered mental status.   Allergic/Immunologic: Negative.         Objective     /80 (BP Location: Left arm)   Pulse 64   Ht 157.5 cm (62.01\")   Wt 111 kg (245 lb 3.2 oz)   BMI 44.84 kg/m²     Vitals and nursing note reviewed.   Constitutional:       General: Not in acute distress.     Appearance: Well-developed. Not diaphoretic.   Eyes:      Pupils: Pupils are equal, round, and reactive to light.   HENT:      Head: Normocephalic.   Pulmonary:      Effort: Pulmonary effort is normal. No respiratory distress.      Breath sounds: Normal breath sounds.   Cardiovascular:      Normal rate. Regular rhythm.   Pulses:     Intact distal pulses.   Abdominal:      General: Bowel sounds are normal.      Palpations: Abdomen is soft.   Musculoskeletal: Normal range of motion.      Cervical back: Normal range of motion. Skin:     General: Skin is warm and dry.   Neurological:      Mental Status: Alert and oriented to person, place, and time. "        Procedures         Problem List Items Addressed This Visit          Endocrine and Metabolic    Morbid obesity with BMI of 40.0-44.9, adult - Primary    Relevant Orders    Ambulatory Referral to General Surgery (Completed)     Other Visit Diagnoses       Type 2 diabetes mellitus without complication, without long-term current use of insulin        Relevant Orders    Ambulatory Referral to General Surgery (Completed)    Fatty liver        Relevant Orders    Ambulatory Referral to General Surgery (Completed)           1. No recurrent chest pain. Stress findings stable. No ischemia, normal LVEF. Encourage low sodium diet. Weight loss.      2. Hyperlipidemia- she prefers to be managed conservatively as she is intolerant to most cholesterol lowering medications.  Limit sugars, CHO, increase lean protein and plant based protein. [facility] refer to [facility]Dr. Ibarra for management of medical vs surgical weight loss.      3. LOUISE- not able to tolerate CPAP, follow with pulmonology.      No changes made today. FU in one year or sooner as needed.     Class 3 Severe Obesity (BMI >=40). Obesity-related health conditions include the following: obstructive sleep apnea, hypertension, coronary heart disease, diabetes mellitus, dyslipidemias and GERD. Obesity is worsening. BMI is is above average; BMI management plan is completed. We discussed portion control and increasing exercise.              Electronically signed by HERBIE Barron,  Natasha 15, 2023 14:15 EDT

## 2023-06-15 NOTE — PROGRESS NOTES
Chief Complaint   Patient presents with   • Follow-up     Cardiac management   • Lab     Last labs about 3 months ago per PCP.   • Shortness of Breath     Same as before, no worsening. Wears oxygen as needed. Has CPAP at night, does not wear daily.   • Med Refill     PCP writes refills on medications.     Subjective       Danay Mcdowell is a 63 y.o. female with diabetes, hypothyroidism and severe arthritis referred for cardiac clearance prior to undergoing knee surgery. She admitted to intermittent sharp chest pain. Dobutamine stress showed hypertensive response but no ischemic changes. EF normal. She was cleared for surgery. Labs showed , Tri 377, HDL 43, , normal thyroid, LFT, CBC. Crestor recommended but she wanted to avoid statin secondary to her liver problem, fatty liver cirrhosis. Fenofibrate then added but made her sick. She would like to undergo bariatric surgery. She has gastric band which has been ineffective.     She returns today for follow up. Her main concern is weight gain. She has SOB with exertion and fatigue she attributes to her weight. Her daughter and her  underwent gastric sleeve and she would like to try medical weight loss before undergoing surgery. Labs and refills per PCP.         Cardiac History:    Past Surgical History:   Procedure Laterality Date   • CARDIOVASCULAR STRESS TEST  11/15/2021    Dobutamine- 86% THR. EF 66%. R/O Breast attenuation. BP- 151/113   • ECHO - CONVERTED  11/15/2021    EF 65%. LA- 4.1 Cm. Mild MR   • OTHER SURGICAL HISTORY  11/30/2021    Pulse O2- Abnormal     Current Outpatient Medications   Medication Sig Dispense Refill   • albuterol (PROVENTIL) 2.5 MG/0.5ML nebulizer solution Take 2.5 mg by nebulization 3 (Three) Times a Day As Needed for Wheezing.     • albuterol sulfate  (90 Base) MCG/ACT inhaler Inhale 2 puffs Every 4 (Four) Hours As Needed for Wheezing.     • citalopram (CeleXA) 40 MG tablet Take 1 tablet by mouth Daily.     •  fluticasone (FLONASE) 50 MCG/ACT nasal spray 2 sprays into the nostril(s) as directed by provider Daily.     • furosemide (LASIX) 20 MG tablet Take 1 tablet by mouth Daily.     • levothyroxine (SYNTHROID, LEVOTHROID) 112 MCG tablet Take 1 tablet by mouth Daily.     • linaclotide (LINZESS) 72 MCG capsule capsule Take 1 capsule by mouth Daily As Needed.     • multivitamin with minerals (MULTIVITAMIN ADULTS PO) Take 1 tablet by mouth Daily.     • traMADol-acetaminophen (ULTRACET) 37.5-325 MG per tablet Take 1 tablet by mouth 4 (Four) Times a Day As Needed for Moderate Pain.       No current facility-administered medications for this visit.     Cinnamon flavor and Phenergan [promethazine]    Past Medical History:   Diagnosis Date   • Chronic asthmatic bronchitis    • Cirrhosis    • Depression    • Diabetes mellitus    • Fibromyalgia    • GERD (gastroesophageal reflux disease)    • History of back surgery    • Hx of cholecystectomy    • Hx of hysterectomy    • Hx of laparoscopic gastric banding    • Hx of thyroidectomy    • Hyperlipidemia    • Hypothyroidism    • Osteoarthritis    • Sleep apnea     currently does not wear CPAP   • Vitamin D deficiency      Social History     Socioeconomic History   • Marital status:    Tobacco Use   • Smoking status: Never   • Smokeless tobacco: Never   Vaping Use   • Vaping Use: Never used   Substance and Sexual Activity   • Alcohol use: Never   • Drug use: Never   • Sexual activity: Defer     Family History   Problem Relation Age of Onset   • Diabetes Mother    • Thyroid disease Mother    • Liver disease Mother    • Diabetes Father    • Heart attack Father    • Diabetes Sister    • Heart attack Sister 43   • Heart disease Brother    • Heart attack Brother    • Diabetes Brother    • No Known Problems Daughter        Review of Systems   Constitutional: Positive for malaise/fatigue and weight gain. Negative for decreased appetite.   HENT: Negative.    Eyes: Negative for blurred  "vision.   Cardiovascular: Positive for dyspnea on exertion. Negative for chest pain, leg swelling, palpitations and syncope.   Respiratory: Negative for shortness of breath and sleep disturbances due to breathing.    Endocrine: Negative.    Hematologic/Lymphatic: Negative for bleeding problem. Does not bruise/bleed easily.   Skin: Negative.    Musculoskeletal: Negative for falls and myalgias.   Gastrointestinal: Negative for abdominal pain, heartburn and melena.   Genitourinary: Negative for hematuria.   Neurological: Negative for dizziness and light-headedness.   Psychiatric/Behavioral: Negative for altered mental status.   Allergic/Immunologic: Negative.         Objective     /80 (BP Location: Left arm)   Pulse 64   Ht 157.5 cm (62.01\")   Wt 111 kg (245 lb 3.2 oz)   BMI 44.84 kg/m²     Vitals and nursing note reviewed.   Constitutional:       General: Not in acute distress.     Appearance: Well-developed. Not diaphoretic.   Eyes:      Pupils: Pupils are equal, round, and reactive to light.   HENT:      Head: Normocephalic.   Pulmonary:      Effort: Pulmonary effort is normal. No respiratory distress.      Breath sounds: Normal breath sounds.   Cardiovascular:      Normal rate. Regular rhythm.   Pulses:     Intact distal pulses.   Abdominal:      General: Bowel sounds are normal.      Palpations: Abdomen is soft.   Musculoskeletal: Normal range of motion.      Cervical back: Normal range of motion. Skin:     General: Skin is warm and dry.   Neurological:      Mental Status: Alert and oriented to person, place, and time.        Procedures          Problem List Items Addressed This Visit          Endocrine and Metabolic    Morbid obesity with BMI of 40.0-44.9, adult - Primary    Relevant Orders    Ambulatory Referral to General Surgery (Completed)     Other Visit Diagnoses       Type 2 diabetes mellitus without complication, without long-term current use of insulin        Relevant Orders    Ambulatory " Referral to General Surgery (Completed)    Fatty liver        Relevant Orders    Ambulatory Referral to General Surgery (Completed)           1. No recurrent chest pain. Stress findings stable. No ischemia, normal LVEF. Encourage low sodium diet. Weight loss.      2. Hyperlipidemia- she prefers to be managed conservatively as she is intolerant to most cholesterol lowering medications.  Limit sugars, CHO, increase lean protein and plant based protein. [facility] refer [facility]Dr. Ibarra for management of medical vs surgical weight loss.      3. LOUISE- not able to tolerate CPAP, follow with pulmonology.      No changes made today. FU in one year or sooner as needed.     Class 3 Severe Obesity (BMI >=40). Obesity-related health conditions include the following: obstructive sleep apnea, hypertension, coronary heart disease, diabetes mellitus, dyslipidemias and GERD. Obesity is worsening. BMI is is above average; BMI management plan is completed. We discussed portion control and increasing exercise.               Electronically signed by HERBIE Barron,  Natasha 15, 2023 14:15 EDT

## 2024-09-19 ENCOUNTER — TELEPHONE (OUTPATIENT)
Dept: CARDIOLOGY | Facility: CLINIC | Age: 64
End: 2024-09-19
Payer: MEDICARE

## 2024-10-01 ENCOUNTER — OFFICE VISIT (OUTPATIENT)
Dept: CARDIOLOGY | Facility: CLINIC | Age: 64
End: 2024-10-01
Payer: MEDICARE

## 2024-10-01 VITALS
HEIGHT: 62 IN | SYSTOLIC BLOOD PRESSURE: 118 MMHG | BODY MASS INDEX: 47.26 KG/M2 | HEART RATE: 66 BPM | OXYGEN SATURATION: 95 % | DIASTOLIC BLOOD PRESSURE: 68 MMHG | WEIGHT: 256.8 LBS

## 2024-10-01 DIAGNOSIS — R06.02 SHORTNESS OF BREATH: ICD-10-CM

## 2024-10-01 DIAGNOSIS — R60.1 GENERALIZED EDEMA: ICD-10-CM

## 2024-10-01 DIAGNOSIS — G47.30 SLEEP APNEA IN ADULT: ICD-10-CM

## 2024-10-01 PROCEDURE — 99214 OFFICE O/P EST MOD 30 MIN: CPT | Performed by: NURSE PRACTITIONER

## 2024-10-01 PROCEDURE — 1160F RVW MEDS BY RX/DR IN RCRD: CPT | Performed by: NURSE PRACTITIONER

## 2024-10-01 PROCEDURE — 1159F MED LIST DOCD IN RCRD: CPT | Performed by: NURSE PRACTITIONER

## 2024-10-01 RX ORDER — LORAZEPAM 1 MG/1
TABLET ORAL EVERY 24 HOURS
COMMUNITY
Start: 2024-09-27

## 2024-10-01 RX ORDER — MELOXICAM 15 MG/1
1 TABLET ORAL DAILY
COMMUNITY
Start: 2024-09-27 | End: 2025-03-26

## 2024-10-01 RX ORDER — TRAMADOL HYDROCHLORIDE 50 MG/1
TABLET ORAL
COMMUNITY
End: 2024-10-01

## 2024-10-01 RX ORDER — POTASSIUM CHLORIDE 1500 MG/1
TABLET, EXTENDED RELEASE ORAL EVERY 24 HOURS
COMMUNITY
Start: 2024-09-27 | End: 2024-12-25

## 2024-10-01 NOTE — PROGRESS NOTES
Chief Complaint   Patient presents with    Follow-up     Cardiac management  Denies CP, SOB, and palpations/dizziness.    Med Refill     Pt request 30 day refills to be sent to [facility] .  Medications were verified by pt.      labs     Pt states their last labs was last week.         Subjective       Danay Mcdowell is a 64 y.o. female with DM, hypothyroidism, severe arthritis referred for cardiac clearance prior to knee surgery.  WME stress test showed HTN response but no ischemic changes.  EF normal.  Cardiac clearance given.  LDL was elevated but declines statin due to liver problem, fatty liver cirrhosis.  She did not tolerate fenofibrate.  She has history of gastric band, wanted to try medical weight loss.  At June 2023 office visit referral was made to [facility]Dr. Ibarra.    Today she returns to the office for a follow-up visit.    Cardiac History:    Past Surgical History:   Procedure Laterality Date    CARDIOVASCULAR STRESS TEST  11/15/2021    Dobutamine- 86% THR. EF 66%. R/O Breast attenuation. BP- 151/113    ECHO - CONVERTED  11/15/2021    EF 65%. LA- 4.1 Cm. Mild MR    OTHER SURGICAL HISTORY  11/30/2021    Pulse O2- Abnormal       Current Outpatient Medications   Medication Sig Dispense Refill    albuterol (PROVENTIL) 2.5 MG/0.5ML nebulizer solution Take 2.5 mg by nebulization 3 (Three) Times a Day As Needed for Wheezing.      albuterol sulfate  (90 Base) MCG/ACT inhaler Inhale 2 puffs Every 4 (Four) Hours As Needed for Wheezing.      citalopram (CeleXA) 40 MG tablet Take 1 tablet by mouth Daily.      fluticasone (FLONASE) 50 MCG/ACT nasal spray Administer 2 sprays into the nostril(s) as directed by provider Daily.      furosemide (LASIX) 20 MG tablet Take 1 tablet by mouth Daily.      levothyroxine (SYNTHROID, LEVOTHROID) 112 MCG tablet Take 1 tablet by mouth Daily.      linaclotide (LINZESS) 72 MCG capsule capsule Take 1 capsule by mouth Daily As Needed.      LORazepam (ATIVAN) 1 MG  tablet Daily.      meloxicam (MOBIC) 15 MG tablet Take 1 tablet by mouth Daily.      potassium chloride ER (K-TAB) 20 MEQ tablet controlled-release ER tablet Daily.       No current facility-administered medications for this visit.       Cinnamon flavor and Phenergan [promethazine]    Past Medical History:   Diagnosis Date    Chronic asthmatic bronchitis     Cirrhosis     Depression     Diabetes mellitus     Fibromyalgia     GERD (gastroesophageal reflux disease)     History of back surgery     Hx of cholecystectomy     Hx of hysterectomy     Hx of laparoscopic gastric banding     Hx of thyroidectomy     Hyperlipidemia     Hypothyroidism     Osteoarthritis     Sleep apnea     currently does not wear CPAP    Vitamin D deficiency        Social History     Socioeconomic History    Marital status:    Tobacco Use    Smoking status: Never    Smokeless tobacco: Never   Vaping Use    Vaping status: Never Used   Substance and Sexual Activity    Alcohol use: Never    Drug use: Never    Sexual activity: Defer       Family History   Problem Relation Age of Onset    Diabetes Mother     Thyroid disease Mother     Liver disease Mother     Diabetes Father     Heart attack Father     Diabetes Sister     Heart attack Sister 43    Heart disease Brother     Heart attack Brother     Diabetes Brother     No Known Problems Daughter        Review of Systems   Constitutional: Positive for malaise/fatigue. Negative for diaphoresis and fever.   Cardiovascular:  Positive for leg swelling (at times) and near-syncope. Negative for chest pain and palpitations.   Respiratory:  Positive for shortness of breath and sleep disturbances due to breathing.    Endocrine: Positive for cold intolerance and heat intolerance.   Musculoskeletal:  Positive for back pain and falls.   Gastrointestinal:  Positive for bloating and heartburn. Negative for dysphagia, melena and nausea.   Genitourinary:  Negative for hematuria.   Neurological:  Positive for  "loss of balance (using cane for safety).   Psychiatric/Behavioral:  The patient is nervous/anxious.         BP Readings from Last 5 Encounters:   10/01/24 118/68   06/15/23 130/80   06/15/22 110/62   11/11/21 120/72       Wt Readings from Last 5 Encounters:   10/01/24 116 kg (256 lb 12.8 oz)   06/15/23 111 kg (245 lb 3.2 oz)   06/15/22 110 kg (242 lb 12.8 oz)   11/11/21 108 kg (237 lb 9.6 oz)       Objective     /68   Pulse 66   Ht 157.5 cm (62.01\")   Wt 116 kg (256 lb 12.8 oz)   LMP  (LMP Unknown)   SpO2 95%   BMI 46.95 kg/m²     Constitutional:       Appearance: Well-groomed and not in distress. Morbidly obese. Chronically ill-appearing.   Neck:      Vascular: No carotid bruit.   Pulmonary:      Effort: Pulmonary effort is normal.      Breath sounds: Normal breath sounds. No rales.   Cardiovascular:      PMI at left midclavicular line. Normal rate. loud S2.       Murmurs: There is no murmur.   Pulses:     No decreased pulses.   Edema:     Ankle: bilateral trace edema of the ankle.     Feet: bilateral trace edema of the feet.  Musculoskeletal:      Cervical back: Neck supple. Skin:     General: Skin is warm and dry.   Neurological:      Gait: Gait abnormal.   Psychiatric:         Mood and Affect: Mood is anxious.         Speech: Speech normal.         Behavior: Behavior is cooperative.          Procedures: None today         Assessment & Plan   Diagnoses and all orders for this visit:    1. Body mass index 45.0-49.9, adult (Primary)    2. Shortness of breath  -     Adult Transthoracic Echo Complete W/ Cont if Necessary Per Protocol; Future    3. Generalized edema  -     Adult Transthoracic Echo Complete W/ Cont if Necessary Per Protocol; Future    4. Sleep apnea in adult      Morbid obesity  -Followed by weight loss clinic, Dr. Wood.   -Appears clinically stable to proceed with lap band removal.  Cardiac clearance given with usual precaution.    Shortness of breath/edema  -2021 stress test and " echocardiogram: Normal LVEF, no ischemia  -Repeat echocardiogram to relook at overall cardiac status  -Continue Lasix and potassium    LOUISE  -Admits has not been using CPAP  -Informational handout on sleep apnea provided  -Patient agrees she will resume using CPAP    Hyperlipidemia  -Patient maintains conservative management, intolerant to most cholesterol-lowering agents.  -Liver disease managed by Dr. Jeremias SPRING                  Electronically signed by HERBIE Zaidi,  October 1, 2024 10:59 EDT    Dictated Utilizing Dragon Dictation: Part of this note may be an electronic transcription/translation of spoken language to printed text using the Dragon Dictation System.

## 2024-10-14 ENCOUNTER — HOSPITAL ENCOUNTER (OUTPATIENT)
Dept: CARDIOLOGY | Facility: HOSPITAL | Age: 64
Discharge: HOME | End: 2024-10-14
Admitting: NURSE PRACTITIONER
Payer: MEDICARE

## 2024-10-14 DIAGNOSIS — R60.1 GENERALIZED EDEMA: ICD-10-CM

## 2024-10-14 DIAGNOSIS — R06.02 SHORTNESS OF BREATH: ICD-10-CM

## 2024-10-14 LAB
BH CV ECHO MEAS - ACS: 2.03 CM
BH CV ECHO MEAS - AO MAX PG: 14 MMHG
BH CV ECHO MEAS - AO MEAN PG: 6.3 MMHG
BH CV ECHO MEAS - AO ROOT DIAM: 3 CM
BH CV ECHO MEAS - AO V2 MAX: 187.3 CM/SEC
BH CV ECHO MEAS - AO V2 VTI: 36 CM
BH CV ECHO MEAS - EDV(CUBED): 131.1 ML
BH CV ECHO MEAS - EDV(MOD-SP4): 87.5 ML
BH CV ECHO MEAS - EF(MOD-SP4): 55 %
BH CV ECHO MEAS - EF_3D-VOL: 56 %
BH CV ECHO MEAS - ESV(CUBED): 33.4 ML
BH CV ECHO MEAS - ESV(MOD-SP4): 39.4 ML
BH CV ECHO MEAS - FS: 36.6 %
BH CV ECHO MEAS - IVS/LVPW: 0.99 CM
BH CV ECHO MEAS - IVSD: 1.23 CM
BH CV ECHO MEAS - LA DIMENSION: 4.3 CM
BH CV ECHO MEAS - LAT PEAK E' VEL: 6.3 CM/SEC
BH CV ECHO MEAS - LV DIASTOLIC VOL/BSA (35-75): 41.2 CM2
BH CV ECHO MEAS - LV MASS(C)D: 249.6 GRAMS
BH CV ECHO MEAS - LV SYSTOLIC VOL/BSA (12-30): 18.6 CM2
BH CV ECHO MEAS - LVIDD: 5.1 CM
BH CV ECHO MEAS - LVIDS: 3.2 CM
BH CV ECHO MEAS - LVPWD: 1.24 CM
BH CV ECHO MEAS - MED PEAK E' VEL: 7 CM/SEC
BH CV ECHO MEAS - MV A MAX VEL: 87 CM/SEC
BH CV ECHO MEAS - MV DEC TIME: 0.3 SEC
BH CV ECHO MEAS - MV E MAX VEL: 77.1 CM/SEC
BH CV ECHO MEAS - MV E/A: 0.89
BH CV ECHO MEAS - RVDD: 3.1 CM
BH CV ECHO MEAS - SV(MOD-SP4): 48.1 ML
BH CV ECHO MEAS - SVI(MOD-SP4): 22.7 ML/M2
BH CV ECHO MEASUREMENTS AVERAGE E/E' RATIO: 11.59
LEFT ATRIUM VOLUME INDEX: 14.1 ML/M2

## 2024-10-14 PROCEDURE — 93306 TTE W/DOPPLER COMPLETE: CPT

## 2024-10-14 PROCEDURE — 93306 TTE W/DOPPLER COMPLETE: CPT | Performed by: INTERNAL MEDICINE

## 2024-11-08 ENCOUNTER — TELEPHONE (OUTPATIENT)
Dept: CARDIOLOGY | Facility: CLINIC | Age: 64
End: 2024-11-08
Payer: MEDICARE

## 2024-11-08 NOTE — TELEPHONE ENCOUNTER
Received fax from Dr. Ibarra for cardiac clearance for patient to have bariatric surgery. According to our records, I do not see where patient is on any blood thinners or has had any stenting.          Fax [phone]

## 2025-04-03 DIAGNOSIS — R00.1 BRADYCARDIA, SINUS: Primary | ICD-10-CM

## 2025-04-14 ENCOUNTER — TELEPHONE (OUTPATIENT)
Dept: CARDIOLOGY | Facility: CLINIC | Age: 65
End: 2025-04-14
Payer: MEDICARE

## 2025-04-14 NOTE — TELEPHONE ENCOUNTER
Caller: Danay Mcdowell    Relationship to patient: Self    Best call back number: [phone]    Patient is needing: PATIENT ASKING HOW LONG DOES SHE WEARS THE MONITOR? PLEASE CALL HER TO ADVISE.

## 2025-04-14 NOTE — TELEPHONE ENCOUNTER
Spoke with patient concerning how long to wear monitor. Advised patient holter monitor was ordered for 7 days and then she needs to mail monitor back. Understanding voiced.

## 2025-04-28 ENCOUNTER — OFFICE VISIT (OUTPATIENT)
Dept: CARDIOLOGY | Facility: CLINIC | Age: 65
End: 2025-04-28
Payer: MEDICARE

## 2025-04-28 VITALS
SYSTOLIC BLOOD PRESSURE: 104 MMHG | HEART RATE: 56 BPM | WEIGHT: 230.2 LBS | HEIGHT: 62 IN | BODY MASS INDEX: 42.36 KG/M2 | DIASTOLIC BLOOD PRESSURE: 64 MMHG

## 2025-04-28 DIAGNOSIS — I49.5 TACHYCARDIA-BRADYCARDIA SYNDROME: ICD-10-CM

## 2025-04-28 DIAGNOSIS — E78.5 HYPERLIPIDEMIA LDL GOAL <100: Primary | ICD-10-CM

## 2025-04-28 DIAGNOSIS — G47.30 SLEEP APNEA IN ADULT: ICD-10-CM

## 2025-04-28 DIAGNOSIS — R06.02 SHORTNESS OF BREATH: ICD-10-CM

## 2025-04-28 PROCEDURE — 1159F MED LIST DOCD IN RCRD: CPT | Performed by: NURSE PRACTITIONER

## 2025-04-28 PROCEDURE — 1160F RVW MEDS BY RX/DR IN RCRD: CPT | Performed by: NURSE PRACTITIONER

## 2025-04-28 PROCEDURE — 99214 OFFICE O/P EST MOD 30 MIN: CPT | Performed by: NURSE PRACTITIONER

## 2025-04-28 NOTE — PROGRESS NOTES
Chief Complaint   Patient presents with    Follow-up     Cardiac management    Lab     Last labs 2/20/25, see chart.    Heart rate     Reports heart rate has been in the 40s -50s.     Fatigue     Has had increased fatigue since January. She did have surgery in January with blood loss.    Chest Pain     Has noticed pressure in mid chest, short in duration. Feels could be related to indigestion. Has noticed pain in back between shoulder blades.    Palpitations     Notices occasional flutter, not often.    Med Refill     PCP writes refills on medication.    Weight loss     She had gastric sleeve placed and hernia repair in January.    Sleep apnea     She is getting new CPAP machine, to start wearing CPAP again this week.     Subjective     HPI    Danay Mcdowell is a 65 y.o. female with DM, hypothyroidism, FLD/cirrhosis, sleep apnea and severe arthritis referred for cardiac clearance prior to knee surgery.  Stress test showed no ischemia, normal LVEF, November 2021.  She returned in October 2024 requesting clearance for gastric sleeve surgery and removal of gastric band.  Echo remained stable and she was cleared.  Underwent gastric sleeve and hernia repair in January 2025.  2 days postop she return to [facility] with anemia, hemoglobin 8.8 (Hgb 13 preop).  Did not require transfusion.    She returns today for follow-up.  26 pound weight loss.  She continues to feel fatigued, slowly improving since her bariatric surgery.  Holter secondary to Bradycardia showed average rate 56, min/max 41/110, 3 short runs of SVT.  No pauses seen.  She plans to see PCP next week for repeat labs.    Cardiac History:    Past Surgical History:   Procedure Laterality Date    CARDIOVASCULAR STRESS TEST  11/15/2021    Dobutamine- 86% THR. EF 66%. R/O Breast attenuation. BP- 151/113    CONVERTED (HISTORICAL) HOLTER  04/24/2025    7 Days. Avg 56. . 3 SVT    ECHO - CONVERTED  11/15/2021    EF 65%. LA- 4.1 Cm. Mild MR    ECHO - CONVERTED   10/14/2024    EF 65%. LA-4.3. Trace-Mild MR    OTHER SURGICAL HISTORY  11/30/2021    Pulse O2- Abnormal     Current Outpatient Medications   Medication Sig Dispense Refill    albuterol (PROVENTIL) 2.5 MG/0.5ML nebulizer solution Take 2.5 mg by nebulization 3 (Three) Times a Day As Needed for Wheezing.      albuterol sulfate  (90 Base) MCG/ACT inhaler Inhale 2 puffs Every 4 (Four) Hours As Needed for Wheezing.      citalopram (CeleXA) 40 MG tablet Take 1 tablet by mouth Daily.      fluticasone (FLONASE) 50 MCG/ACT nasal spray Administer 2 sprays into the nostril(s) as directed by provider Daily.      furosemide (LASIX) 20 MG tablet Take 1 tablet by mouth Daily.      levothyroxine (SYNTHROID, LEVOTHROID) 112 MCG tablet Take 1 tablet by mouth Daily.      linaclotide (LINZESS) 72 MCG capsule capsule Take 1 capsule by mouth Daily As Needed.      LORazepam (ATIVAN) 1 MG tablet 1 tablet Daily As Needed.       No current facility-administered medications for this visit.     Cinnamon flavoring agent (non-screening) and Phenergan [promethazine]    Past Medical History:   Diagnosis Date    Chronic asthmatic bronchitis     Cirrhosis     Depression     Diabetes mellitus     Fibromyalgia     GERD (gastroesophageal reflux disease)     H/O gastric sleeve     History of back surgery     Hx of cholecystectomy     Hx of hernia repair     Hx of hysterectomy     Hx of laparoscopic gastric banding     Hx of thyroidectomy     Hyperlipidemia     Hypothyroidism     Osteoarthritis     Sleep apnea     currently does not wear CPAP    Vitamin D deficiency      Social History     Socioeconomic History    Marital status:    Tobacco Use    Smoking status: Never     Passive exposure: Never    Smokeless tobacco: Never   Vaping Use    Vaping status: Never Used   Substance and Sexual Activity    Alcohol use: Never    Drug use: Never    Sexual activity: Defer     Family History   Problem Relation Age of Onset    Diabetes Mother      "Thyroid disease Mother     Liver disease Mother     Diabetes Father     Heart attack Father     Diabetes Sister     Heart attack Sister 43    Heart disease Brother     Heart attack Brother     Diabetes Brother     No Known Problems Daughter      Review of Systems   Constitutional: Positive for malaise/fatigue and weight loss (-26). Negative for decreased appetite.   HENT: Negative.     Eyes:  Negative for blurred vision.   Cardiovascular:  Positive for dyspnea on exertion and palpitations. Negative for chest pain, leg swelling and syncope.   Respiratory:  Negative for shortness of breath and sleep disturbances due to breathing.    Endocrine: Negative.    Hematologic/Lymphatic: Negative for bleeding problem. Does not bruise/bleed easily.   Skin: Negative.    Musculoskeletal:  Positive for arthritis, gout (New onset post-bariatric surgery), joint pain and stiffness. Negative for falls and myalgias.   Gastrointestinal:  Negative for abdominal pain, heartburn and melena.   Genitourinary:  Negative for hematuria.   Neurological:  Negative for dizziness and light-headedness.   Psychiatric/Behavioral:  Negative for altered mental status.    Allergic/Immunologic: Negative.       Objective     /64 (BP Location: Left arm, Patient Position: Sitting)   Pulse 56   Ht 157.5 cm (62.01\")   Wt 104 kg (230 lb 3.2 oz)   BMI 42.09 kg/m²     Vitals and nursing note reviewed.   Constitutional:       General: Not in acute distress.     Appearance: Well-developed. Not diaphoretic.   Eyes:      Pupils: Pupils are equal, round, and reactive to light.   HENT:      Head: Normocephalic.   Pulmonary:      Effort: Pulmonary effort is normal. No respiratory distress.      Breath sounds: Normal breath sounds.   Cardiovascular:      Bradycardia present. Regular rhythm.   Pulses:     Intact distal pulses.   Edema:     Peripheral edema absent.   Abdominal:      General: Bowel sounds are normal.      Palpations: Abdomen is soft. "   Musculoskeletal: Normal range of motion.      Cervical back: Normal range of motion. Skin:     General: Skin is warm and dry.   Neurological:      Mental Status: Alert and oriented to person, place, and time.         Procedures          Problem List Items Addressed This Visit          Cardiac and Vasculature    Hyperlipidemia LDL goal <100 - Primary       Endocrine and Metabolic    Body mass index 45.0-49.9, adult       Pulmonary and Pneumonias    Shortness of breath       Sleep    Sleep apnea in adult      Tachybradycardia  - Holter reviewed in detail, average rate 56, no pauses no significant bradycardia during wake hours  - PPM not indicated at this time, she is on no rate lowering drugs  - Plans to have thyroid rechecked with PCP next week    Metabolic syndrome  -S/p gastric sleeve surgery in January 2025, postop anemia noted   - 26 pound weight loss, encouraged high-protein diet, adequate fluid intake    LOUISE  -She is getting a new CPAP machine, to start this week    Hyperlipidemia  -Labs followed by PCP  -Managed with diet    Gout  -Advised to use Lasix only as needed    Cardiac status stable.  She is encouraged to see PCP or neurosurgery regarding significant back pain and left leg pain with abnormal MRI in August 2024.    Class 3 Severe Obesity (BMI >=40). Obesity-related health conditions include the following: obstructive sleep apnea, hypertension, coronary heart disease, diabetes mellitus, impaired fasting glucose, and dyslipidemias. Obesity is improving with treatment. BMI is is above average; BMI management plan is completed. We discussed portion control and increasing exercise.               Electronically signed by HERBIE Barron,  April 28, 2025 17:09 EDT

## 2025-07-22 ENCOUNTER — TELEPHONE (OUTPATIENT)
Dept: CARDIOLOGY | Facility: CLINIC | Age: 65
End: 2025-07-22
Payer: MEDICARE